# Patient Record
Sex: FEMALE | Race: WHITE | NOT HISPANIC OR LATINO
[De-identification: names, ages, dates, MRNs, and addresses within clinical notes are randomized per-mention and may not be internally consistent; named-entity substitution may affect disease eponyms.]

---

## 2017-03-15 ENCOUNTER — TRANSCRIPTION ENCOUNTER (OUTPATIENT)
Age: 52
End: 2017-03-15

## 2017-07-27 ENCOUNTER — APPOINTMENT (OUTPATIENT)
Dept: ULTRASOUND IMAGING | Facility: CLINIC | Age: 52
End: 2017-07-27
Payer: COMMERCIAL

## 2017-07-27 ENCOUNTER — OUTPATIENT (OUTPATIENT)
Dept: OUTPATIENT SERVICES | Facility: HOSPITAL | Age: 52
LOS: 1 days | End: 2017-07-27
Payer: COMMERCIAL

## 2017-07-27 ENCOUNTER — APPOINTMENT (OUTPATIENT)
Dept: MAMMOGRAPHY | Facility: CLINIC | Age: 52
End: 2017-07-27
Payer: COMMERCIAL

## 2017-07-27 DIAGNOSIS — Z00.8 ENCOUNTER FOR OTHER GENERAL EXAMINATION: ICD-10-CM

## 2017-07-27 PROCEDURE — G0204: CPT | Mod: 26

## 2017-07-27 PROCEDURE — 76641 ULTRASOUND BREAST COMPLETE: CPT | Mod: 26,50

## 2017-07-27 PROCEDURE — 76641 ULTRASOUND BREAST COMPLETE: CPT

## 2017-07-27 PROCEDURE — G0279: CPT | Mod: 26

## 2017-07-27 PROCEDURE — 77066 DX MAMMO INCL CAD BI: CPT

## 2017-07-27 PROCEDURE — G0279: CPT

## 2019-06-21 ENCOUNTER — TRANSCRIPTION ENCOUNTER (OUTPATIENT)
Age: 54
End: 2019-06-21

## 2019-09-10 ENCOUNTER — APPOINTMENT (OUTPATIENT)
Dept: MAMMOGRAPHY | Facility: CLINIC | Age: 54
End: 2019-09-10
Payer: COMMERCIAL

## 2019-09-10 ENCOUNTER — OUTPATIENT (OUTPATIENT)
Dept: OUTPATIENT SERVICES | Facility: HOSPITAL | Age: 54
LOS: 1 days | End: 2019-09-10
Payer: COMMERCIAL

## 2019-09-10 ENCOUNTER — APPOINTMENT (OUTPATIENT)
Dept: ULTRASOUND IMAGING | Facility: CLINIC | Age: 54
End: 2019-09-10
Payer: COMMERCIAL

## 2019-09-10 DIAGNOSIS — Z00.8 ENCOUNTER FOR OTHER GENERAL EXAMINATION: ICD-10-CM

## 2019-09-10 PROCEDURE — 76641 ULTRASOUND BREAST COMPLETE: CPT | Mod: 26,50

## 2019-09-10 PROCEDURE — 77067 SCR MAMMO BI INCL CAD: CPT | Mod: 26

## 2019-09-10 PROCEDURE — 77067 SCR MAMMO BI INCL CAD: CPT

## 2019-09-10 PROCEDURE — 77063 BREAST TOMOSYNTHESIS BI: CPT

## 2019-09-10 PROCEDURE — 77063 BREAST TOMOSYNTHESIS BI: CPT | Mod: 26

## 2019-09-10 PROCEDURE — 76641 ULTRASOUND BREAST COMPLETE: CPT

## 2021-02-03 ENCOUNTER — RESULT REVIEW (OUTPATIENT)
Age: 56
End: 2021-02-03

## 2021-02-11 ENCOUNTER — APPOINTMENT (OUTPATIENT)
Dept: ULTRASOUND IMAGING | Facility: CLINIC | Age: 56
End: 2021-02-11
Payer: COMMERCIAL

## 2021-02-11 ENCOUNTER — OUTPATIENT (OUTPATIENT)
Dept: OUTPATIENT SERVICES | Facility: HOSPITAL | Age: 56
LOS: 1 days | End: 2021-02-11
Payer: COMMERCIAL

## 2021-02-11 ENCOUNTER — APPOINTMENT (OUTPATIENT)
Dept: MAMMOGRAPHY | Facility: CLINIC | Age: 56
End: 2021-02-11
Payer: COMMERCIAL

## 2021-02-11 DIAGNOSIS — N28.1 CYST OF KIDNEY, ACQUIRED: ICD-10-CM

## 2021-02-11 PROCEDURE — 77063 BREAST TOMOSYNTHESIS BI: CPT

## 2021-02-11 PROCEDURE — 77063 BREAST TOMOSYNTHESIS BI: CPT | Mod: 26

## 2021-02-11 PROCEDURE — 77067 SCR MAMMO BI INCL CAD: CPT | Mod: 26

## 2021-02-11 PROCEDURE — 76641 ULTRASOUND BREAST COMPLETE: CPT | Mod: 26,50

## 2021-02-11 PROCEDURE — 77067 SCR MAMMO BI INCL CAD: CPT

## 2021-02-11 PROCEDURE — 76641 ULTRASOUND BREAST COMPLETE: CPT

## 2021-05-06 ENCOUNTER — TRANSCRIPTION ENCOUNTER (OUTPATIENT)
Age: 56
End: 2021-05-06

## 2021-07-30 ENCOUNTER — TRANSCRIPTION ENCOUNTER (OUTPATIENT)
Age: 56
End: 2021-07-30

## 2022-02-03 ENCOUNTER — TRANSCRIPTION ENCOUNTER (OUTPATIENT)
Age: 57
End: 2022-02-03

## 2022-03-24 ENCOUNTER — ASOB RESULT (OUTPATIENT)
Age: 57
End: 2022-03-24

## 2022-03-24 ENCOUNTER — APPOINTMENT (OUTPATIENT)
Dept: OBGYN | Facility: CLINIC | Age: 57
End: 2022-03-24
Payer: COMMERCIAL

## 2022-03-24 PROCEDURE — 76830 TRANSVAGINAL US NON-OB: CPT

## 2022-03-28 ENCOUNTER — APPOINTMENT (OUTPATIENT)
Dept: MAMMOGRAPHY | Facility: CLINIC | Age: 57
End: 2022-03-28
Payer: COMMERCIAL

## 2022-03-28 ENCOUNTER — APPOINTMENT (OUTPATIENT)
Dept: ULTRASOUND IMAGING | Facility: CLINIC | Age: 57
End: 2022-03-28
Payer: COMMERCIAL

## 2022-03-28 ENCOUNTER — OUTPATIENT (OUTPATIENT)
Dept: OUTPATIENT SERVICES | Facility: HOSPITAL | Age: 57
LOS: 1 days | End: 2022-03-28
Payer: COMMERCIAL

## 2022-03-28 DIAGNOSIS — Z00.8 ENCOUNTER FOR OTHER GENERAL EXAMINATION: ICD-10-CM

## 2022-03-28 PROCEDURE — 77067 SCR MAMMO BI INCL CAD: CPT | Mod: 26

## 2022-03-28 PROCEDURE — 77067 SCR MAMMO BI INCL CAD: CPT

## 2022-03-28 PROCEDURE — 76641 ULTRASOUND BREAST COMPLETE: CPT | Mod: 26,50

## 2022-03-28 PROCEDURE — 77063 BREAST TOMOSYNTHESIS BI: CPT | Mod: 26

## 2022-03-28 PROCEDURE — 76641 ULTRASOUND BREAST COMPLETE: CPT

## 2022-03-28 PROCEDURE — 77063 BREAST TOMOSYNTHESIS BI: CPT

## 2022-07-19 ENCOUNTER — NON-APPOINTMENT (OUTPATIENT)
Age: 57
End: 2022-07-19

## 2022-07-27 ENCOUNTER — APPOINTMENT (OUTPATIENT)
Dept: ORTHOPEDIC SURGERY | Facility: CLINIC | Age: 57
End: 2022-07-27

## 2022-07-27 ENCOUNTER — TRANSCRIPTION ENCOUNTER (OUTPATIENT)
Age: 57
End: 2022-07-27

## 2022-07-27 VITALS — BODY MASS INDEX: 18.78 KG/M2 | WEIGHT: 110 LBS | HEIGHT: 64 IN

## 2022-07-27 DIAGNOSIS — M75.01 ADHESIVE CAPSULITIS OF RIGHT SHOULDER: ICD-10-CM

## 2022-07-27 PROCEDURE — 73030 X-RAY EXAM OF SHOULDER: CPT | Mod: RT

## 2022-07-27 PROCEDURE — 99203 OFFICE O/P NEW LOW 30 MIN: CPT

## 2022-07-27 PROCEDURE — 99072 ADDL SUPL MATRL&STAF TM PHE: CPT

## 2022-07-28 PROBLEM — M75.01 ADHESIVE CAPSULITIS OF RIGHT SHOULDER: Status: ACTIVE | Noted: 2022-07-28

## 2022-07-28 NOTE — HISTORY OF PRESENT ILLNESS
[de-identified] : 56 year old female HX adhesive capsulitis to the right shoulder  presents today with right shoulder pain since January 2022. No injury reported. The pain is brought on with FF and abduction. C/O limited ROM/numbness in her finger tips. Takes Advil occasionally with relief. States that pain can radiate in to the neck. She has trialed acupuncture with no significant relief. The patient is active and likes to participate in yoga and cycling. \par \par The patient's past medical history, past surgical history, medications and allergies were reviewed by me today with the patient and documented accordingly. In addition, the patient's family and social history, which were noncontributory to this visit, were reviewed also.

## 2022-07-28 NOTE — DISCUSSION/SUMMARY
[de-identified] : 55 y/o female with right shoulder adhesive capsulitis\par \par The patient presents today with a painful gradual loss of active and passive glenohumeral motion. This is likely due to progressive fibrosis and contracture of the glenohumeral joint. This is referred to as adhesive capsulitis or "frozen shoulder".\par \par I discussed that this occurs in approximately 2-5% of the population, and can affects the contralateral shoulder in approximately 20-30% of patients. This can be due to a primary idiopathic condition, or because of a secondary underlying structural condition. I discussed the 4 stages of adhesive capsulitis. Stage I refers to an inflammatory condition that causes night pain/discomfort with associated full passive ROM. Stage II results in severe pain and restriction of motion during a hyper-inflammatory synovitis. Stage III results in profound tethered stiffness throughout range of motion without acute inflammatory changes. Stage IV results in stiffness with minimal residual pain and dysfunction.I discussed that the natural history of the condition is self-limiting however, this may take up to 12-24 months. Nonoperative treatment includes pharmacological treatment of the inflammation (including NSAID's, intra-articular corticosteroids) and aggressive physical therapy (if tolerated), surgery can address capsular contraction with release/manipulation under anesthesia.\par \par Recommendation: Begin trial of PT, Rx given. Conservative care & observation, this includes rest/activity avoidance until less symptomatic with subsequent gradual return to full activity as tolerated. Patient may also use OTC NSAID's or acetaminophen as tolerated, with application of ice to the area 2-3x daily for 20 minutes after periods of activity. \par \par Follow up 3 months.

## 2022-07-28 NOTE — ADDENDUM
[FreeTextEntry1] : This note was written by Birgit Majano on 07/27/2022 acting solely as a scribe for Dr. Armin Greenberg.\par \par All medical record entries made by the Scribe were at my, Dr. Armin Greenberg, direction and personally dictated by me on 07/27/2022. I have personally reviewed the chart and agree that the record accurately reflects my personal performance of the history, physical exam, assessment and plan.

## 2022-07-28 NOTE — PHYSICAL EXAM
[de-identified] : Oriented to time, place, person\par Mood: Normal\par Affect: Normal\par Appearance: Healthy, well appearing, no acute distress.\par Gait: Normal\par Assistive Devices: None\par \par Right shoulder exam:\par \par Inspection: No malalignment, No defects, No atrophy\par Skin: No masses, No lesions\par Neck: Negative Spurling, full ROM, no pain with ROM\par AROM: FF to 160, abduction to 90, ER to 70, IR to lower lumbar\par Painful arc ROM: none\par Tenderness: No bicipital tenderness, no tenderness to greater tuberosity/RTC insertion, no anterior shoulder/lesser tuberosity tenderness\par Strength: 5/5 ER, 5/5 IR in adduction, 5/5 supraspinatus testing, negative Carolina's test\par AC joint: No TTP/pain with cross arm testing\par Biceps: Speed Negative, Yergason Negative \par Impingement test: + Coreas, + Neer\par Vasc: 2+ radial pulse \par Stability: Stable \par Neuro: AIN, PIN, Ulnar nerve intact to motor\par Sensation: Intact to light touch throughout  [de-identified] : The following radiographs were ordered and read by me during this patients visit. I reviewed each radiograph in detail with the patient and discussed the findings as highlighted below.\par \par 3 views of right shoulder were obtained today, 07/27/2022, that show no acute fracture or dislocation. There is no glenohumeral and no AC joint degenerative change seen. Type II acromion. There is no significant malalignment. No significant other obvious osseous abnormality, otherwise unremarkable.

## 2022-08-16 ENCOUNTER — NON-APPOINTMENT (OUTPATIENT)
Age: 57
End: 2022-08-16

## 2022-10-07 ENCOUNTER — NON-APPOINTMENT (OUTPATIENT)
Age: 57
End: 2022-10-07

## 2022-11-06 ENCOUNTER — NON-APPOINTMENT (OUTPATIENT)
Age: 57
End: 2022-11-06

## 2022-11-23 ENCOUNTER — NON-APPOINTMENT (OUTPATIENT)
Age: 57
End: 2022-11-23

## 2022-11-23 ENCOUNTER — APPOINTMENT (OUTPATIENT)
Dept: INTERNAL MEDICINE | Facility: CLINIC | Age: 57
End: 2022-11-23
Payer: COMMERCIAL

## 2022-11-23 VITALS
SYSTOLIC BLOOD PRESSURE: 120 MMHG | HEART RATE: 73 BPM | OXYGEN SATURATION: 99 % | BODY MASS INDEX: 18.95 KG/M2 | HEIGHT: 64 IN | TEMPERATURE: 98.6 F | WEIGHT: 111 LBS | DIASTOLIC BLOOD PRESSURE: 68 MMHG

## 2022-11-23 DIAGNOSIS — R31.29 OTHER MICROSCOPIC HEMATURIA: ICD-10-CM

## 2022-11-23 DIAGNOSIS — Z81.8 FAMILY HISTORY OF OTHER MENTAL AND BEHAVIORAL DISORDERS: ICD-10-CM

## 2022-11-23 DIAGNOSIS — Z82.49 FAMILY HISTORY OF ISCHEMIC HEART DISEASE AND OTHER DISEASES OF THE CIRCULATORY SYSTEM: ICD-10-CM

## 2022-11-23 DIAGNOSIS — T56.1X1A TOXIC EFFECT OF MERCURY AND ITS COMPOUNDS, ACCIDENTAL (UNINTENTIONAL), INITIAL ENCOUNTER: ICD-10-CM

## 2022-11-23 DIAGNOSIS — F10.10 ALCOHOL ABUSE, UNCOMPLICATED: ICD-10-CM

## 2022-11-23 DIAGNOSIS — Z00.00 ENCOUNTER FOR GENERAL ADULT MEDICAL EXAMINATION W/OUT ABNORMAL FINDINGS: ICD-10-CM

## 2022-11-23 DIAGNOSIS — U07.1 COVID-19: ICD-10-CM

## 2022-11-23 DIAGNOSIS — K63.5 POLYP OF COLON: ICD-10-CM

## 2022-11-23 PROCEDURE — G0444 DEPRESSION SCREEN ANNUAL: CPT | Mod: 59

## 2022-11-23 PROCEDURE — 82271 OCCULT BLOOD OTHER SOURCES: CPT | Mod: QW

## 2022-11-23 PROCEDURE — 99386 PREV VISIT NEW AGE 40-64: CPT | Mod: 25

## 2022-11-23 PROCEDURE — 93000 ELECTROCARDIOGRAM COMPLETE: CPT | Mod: 59

## 2022-11-23 PROCEDURE — 99072 ADDL SUPL MATRL&STAF TM PHE: CPT

## 2022-11-23 NOTE — HEALTH RISK ASSESSMENT
[Never] : Never [No] : In the past 12 months have you used drugs other than those required for medical reasons? No [0] : 2) Feeling down, depressed, or hopeless: Not at all (0) [No falls in past year] : Patient reported no falls in the past year [PHQ-2 Negative - No further assessment needed] : PHQ-2 Negative - No further assessment needed [None] : None [With Family] : lives with family [] :  [Fully functional (bathing, dressing, toileting, transferring, walking, feeding)] : Fully functional (bathing, dressing, toileting, transferring, walking, feeding) [Fully functional (using the telephone, shopping, preparing meals, housekeeping, doing laundry, using] : Fully functional and needs no help or supervision to perform IADLs (using the telephone, shopping, preparing meals, housekeeping, doing laundry, using transportation, managing medications and managing finances) [Smoke Detector] : smoke detector [Carbon Monoxide Detector] : carbon monoxide detector [Seat Belt] :  uses seat belt [Sunscreen] : uses sunscreen [With Patient/Caregiver] : , with patient/caregiver [Patient reported mammogram was normal] : Patient reported mammogram was normal [Patient reported PAP Smear was normal] : Patient reported PAP Smear was normal [de-identified] : sebastian / [de-identified] : no red meat [[id number]] : 0 [EyeExamDate] : 1/1/22 [Change in mental status noted] : No change in mental status noted [MammogramDate] : 3/1/22 [MammogramComments] : dense  breast tissue [PapSmearDate] : 3/1/22 [ColonoscopyDate] : 1/1/21 [ColonoscopyComments] : benign polyp [AdvancecareDate] : 11/23/22

## 2022-11-23 NOTE — ASSESSMENT
[FreeTextEntry1] : advised egg free flu and covid booster.\par All preventative measures were reviewed with the patient and the patient is due for and agrees to the following as outlined  in the plan  below.\par
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
## 2022-11-29 DIAGNOSIS — S10.96XD INSECT BITE OF UNSPECIFIED PART OF NECK, SUBSEQUENT ENCOUNTER: ICD-10-CM

## 2022-12-28 ENCOUNTER — LABORATORY RESULT (OUTPATIENT)
Age: 57
End: 2022-12-28

## 2023-01-04 ENCOUNTER — NON-APPOINTMENT (OUTPATIENT)
Age: 58
End: 2023-01-04

## 2023-01-04 LAB
25(OH)D3 SERPL-MCNC: 77.2 NG/ML
ALBUMIN SERPL ELPH-MCNC: 4.6 G/DL
ALP BLD-CCNC: 101 U/L
ALT SERPL-CCNC: 18 U/L
ANION GAP SERPL CALC-SCNC: 10 MMOL/L
APPEARANCE: CLEAR
AST SERPL-CCNC: 21 U/L
BACTERIA: NEGATIVE
BASOPHILS # BLD AUTO: 0.03 K/UL
BASOPHILS NFR BLD AUTO: 0.6 %
BILIRUB DIRECT SERPL-MCNC: 0.1 MG/DL
BILIRUB SERPL-MCNC: 0.3 MG/DL
BILIRUBIN URINE: NEGATIVE
BLOOD URINE: ABNORMAL
BUN SERPL-MCNC: 16 MG/DL
CALCIUM SERPL-MCNC: 9.6 MG/DL
CHLORIDE SERPL-SCNC: 101 MMOL/L
CHOLEST SERPL-MCNC: 155 MG/DL
CO2 SERPL-SCNC: 28 MMOL/L
COLOR: YELLOW
CREAT SERPL-MCNC: 0.74 MG/DL
EGFR: 94 ML/MIN/1.73M2
EOSINOPHIL # BLD AUTO: 0.14 K/UL
EOSINOPHIL NFR BLD AUTO: 2.8 %
FERRITIN SERPL-MCNC: 77 NG/ML
GLUCOSE QUALITATIVE U: NEGATIVE
GLUCOSE SERPL-MCNC: 99 MG/DL
HCT VFR BLD CALC: 41.4 %
HCV AB SER QL: NONREACTIVE
HCV S/CO RATIO: 0.26 S/CO
HDLC SERPL-MCNC: 52 MG/DL
HGB BLD-MCNC: 13.8 G/DL
HYALINE CASTS: 3 /LPF
IMM GRANULOCYTES NFR BLD AUTO: 0.4 %
IRON SERPL-MCNC: 48 UG/DL
KETONES URINE: NEGATIVE
LDLC SERPL CALC-MCNC: 91 MG/DL
LEUKOCYTE ESTERASE URINE: ABNORMAL
LYMPHOCYTES # BLD AUTO: 1.43 K/UL
LYMPHOCYTES NFR BLD AUTO: 28.3 %
MAN DIFF?: NORMAL
MCHC RBC-ENTMCNC: 31.9 PG
MCHC RBC-ENTMCNC: 33.3 GM/DL
MCV RBC AUTO: 95.6 FL
MICROSCOPIC-UA: NORMAL
MONOCYTES # BLD AUTO: 0.28 K/UL
MONOCYTES NFR BLD AUTO: 5.5 %
NEUTROPHILS # BLD AUTO: 3.15 K/UL
NEUTROPHILS NFR BLD AUTO: 62.4 %
NITRITE URINE: NEGATIVE
NONHDLC SERPL-MCNC: 102 MG/DL
PH URINE: 6
PLATELET # BLD AUTO: 216 K/UL
POTASSIUM SERPL-SCNC: 4.3 MMOL/L
PROT SERPL-MCNC: 7 G/DL
PROTEIN URINE: NORMAL
RBC # BLD: 4.33 M/UL
RBC # FLD: 12 %
RED BLOOD CELLS URINE: 7 /HPF
SODIUM SERPL-SCNC: 139 MMOL/L
SPECIFIC GRAVITY URINE: 1.02
SQUAMOUS EPITHELIAL CELLS: 6 /HPF
T4 SERPL-MCNC: 7.9 UG/DL
TRIGL SERPL-MCNC: 58 MG/DL
TSH SERPL-ACNC: 2.48 UIU/ML
UROBILINOGEN URINE: NORMAL
WBC # FLD AUTO: 5.05 K/UL
WHITE BLOOD CELLS URINE: 8 /HPF

## 2023-01-05 LAB
BACTERIA UR CULT: NORMAL
ESTIMATED AVERAGE GLUCOSE: 111 MG/DL
HBA1C MFR BLD HPLC: 5.5 %

## 2023-01-09 ENCOUNTER — NON-APPOINTMENT (OUTPATIENT)
Age: 58
End: 2023-01-09

## 2023-01-10 ENCOUNTER — APPOINTMENT (OUTPATIENT)
Dept: RADIOLOGY | Facility: CLINIC | Age: 58
End: 2023-01-10
Payer: COMMERCIAL

## 2023-01-10 ENCOUNTER — APPOINTMENT (OUTPATIENT)
Dept: ULTRASOUND IMAGING | Facility: CLINIC | Age: 58
End: 2023-01-10
Payer: COMMERCIAL

## 2023-01-10 ENCOUNTER — OUTPATIENT (OUTPATIENT)
Dept: OUTPATIENT SERVICES | Facility: HOSPITAL | Age: 58
LOS: 1 days | End: 2023-01-10
Payer: COMMERCIAL

## 2023-01-10 DIAGNOSIS — Z00.00 ENCOUNTER FOR GENERAL ADULT MEDICAL EXAMINATION WITHOUT ABNORMAL FINDINGS: ICD-10-CM

## 2023-01-10 PROCEDURE — 93880 EXTRACRANIAL BILAT STUDY: CPT | Mod: 26

## 2023-01-10 PROCEDURE — 77085 DXA BONE DENSITY AXL VRT FX: CPT | Mod: 26

## 2023-01-10 PROCEDURE — 93880 EXTRACRANIAL BILAT STUDY: CPT

## 2023-01-10 PROCEDURE — 77085 DXA BONE DENSITY AXL VRT FX: CPT

## 2023-01-11 ENCOUNTER — NON-APPOINTMENT (OUTPATIENT)
Age: 58
End: 2023-01-11

## 2023-01-14 ENCOUNTER — OUTPATIENT (OUTPATIENT)
Dept: OUTPATIENT SERVICES | Facility: HOSPITAL | Age: 58
LOS: 1 days | End: 2023-01-14
Payer: COMMERCIAL

## 2023-01-14 ENCOUNTER — APPOINTMENT (OUTPATIENT)
Dept: CT IMAGING | Facility: CLINIC | Age: 58
End: 2023-01-14
Payer: COMMERCIAL

## 2023-01-14 DIAGNOSIS — R31.29 OTHER MICROSCOPIC HEMATURIA: ICD-10-CM

## 2023-01-14 PROCEDURE — 74176 CT ABD & PELVIS W/O CONTRAST: CPT | Mod: 26

## 2023-01-14 PROCEDURE — 74176 CT ABD & PELVIS W/O CONTRAST: CPT

## 2023-01-19 DIAGNOSIS — N13.4 HYDROURETER: ICD-10-CM

## 2023-01-23 ENCOUNTER — NON-APPOINTMENT (OUTPATIENT)
Age: 58
End: 2023-01-23

## 2023-01-31 ENCOUNTER — APPOINTMENT (OUTPATIENT)
Dept: UROLOGY | Facility: CLINIC | Age: 58
End: 2023-01-31

## 2023-02-02 ENCOUNTER — APPOINTMENT (OUTPATIENT)
Dept: UROLOGY | Facility: CLINIC | Age: 58
End: 2023-02-02

## 2023-05-23 ENCOUNTER — APPOINTMENT (OUTPATIENT)
Dept: DERMATOLOGY | Facility: CLINIC | Age: 58
End: 2023-05-23

## 2023-06-19 ENCOUNTER — APPOINTMENT (OUTPATIENT)
Dept: DERMATOLOGY | Facility: CLINIC | Age: 58
End: 2023-06-19

## 2023-08-17 ENCOUNTER — APPOINTMENT (OUTPATIENT)
Dept: ENDOCRINOLOGY | Facility: CLINIC | Age: 58
End: 2023-08-17
Payer: COMMERCIAL

## 2023-08-17 VITALS
OXYGEN SATURATION: 99 % | TEMPERATURE: 97.9 F | WEIGHT: 116.5 LBS | HEIGHT: 64 IN | HEART RATE: 65 BPM | RESPIRATION RATE: 14 BRPM | BODY MASS INDEX: 19.89 KG/M2 | DIASTOLIC BLOOD PRESSURE: 60 MMHG | SYSTOLIC BLOOD PRESSURE: 110 MMHG

## 2023-08-17 DIAGNOSIS — M81.0 AGE-RELATED OSTEOPOROSIS W/OUT CURRENT PATHOLOGICAL FRACTURE: ICD-10-CM

## 2023-08-17 PROCEDURE — 99204 OFFICE O/P NEW MOD 45 MIN: CPT

## 2023-08-17 NOTE — ASSESSMENT
[FreeTextEntry1] : 57 year old female with PMH of eating disorder, BMI 20, h/o Mirena use (removed in mid 50s), anxious is presenting for Osteoporosis.   1. Osteoporosis.  She has no history of fragility fracture.  We discussed completion of a metabolic evaluation for secondary causes of bone loss.   We discussed the potential benefits and risks of the osteoanabolic and antiresorptive classes of pharmacologic osteoporosis therapy. We also discussed the potential benefits and risks of antiresorptive osteoporosis therapy with oral bisphosphonates at length, including but not limited to ONJ and atypical femoral fracture.   I still recommend therapy with oral bisphosphonates. Pt hesitant to start medications.  She will consider her options pending evaluation below.  Metabolic evaluation for secondary causes of osteoporosis Calcium 1000 mg daily from diet and supplements (to be taken in divided doses as no more than 500-600 mg can be absorbed at one time); advised increased dietary and/or supplemental calcium Can decrease vitamin D supplementation to 3000 intl units daily Diet, weight bearing exercises and fall prevention discussed  Patient verbalized understanding of the above. All questions were answered to patient's satisfaction. Dispo: Patient will follow up in 4 weeks to discuss above results.

## 2023-08-17 NOTE — HISTORY OF PRESENT ILLNESS
[FreeTextEntry1] : Ms. Dc is presenting for Osteoporosis.   57 year old female with PMH of eating disorder, BMI 20, h/o Mirena use (removed in mid 50s), anxious.   Home meds: Magnesium, Probiotic.  Fracture history: No  Family history: No parental history of hip fracture  Prior Treatment: None  Prior HRT: No   Bone History Menopause: Mid 50s  Jan 2023: T-scores of -2.4 at the lumbar spine, -2.6 at the femoral neck. No compression fracture.   Falls: No Height loss: No Kidney stones: No Dental health: Regular appointments, has history of implants (<1 year ago), no upcoming procedures planned. Exercise: Yes, daily 10 stretch or walk. 20 min yoga or pilate or barr,  Dairy intake: Yogurt daily. Spinach/broccoli.  Calcium supplements: Yes, does not remember.  Multivitamin: No Vitamin D supplements: D3   Osteoporosis risk factors include: Postmenopausal status,  race, prior fracture, falls, height loss, small thin bones, tobacco use, malignancy, radiation treatment, excessive alcohol, anorexia, family history, vitamin D deficiency, long term corticosteroid use (>3 months), seizure medications (ie phenytoin), prolonged amenorrhea, malabsorption, hyperparathyroidism, hyperthyroidism. NEGATIVE EXCEPT:  Postmenopausal status,  race, eating disorders, excessive alcohol.

## 2023-08-17 NOTE — PHYSICAL EXAM
[Alert] : alert [Well Nourished] : well nourished [No Acute Distress] : no acute distress [EOMI] : extra ocular movement intact [Normal Hearing] : hearing was normal [No Respiratory Distress] : no respiratory distress [Normal Rate] : heart rate was normal [Kyphosis] : no kyphosis present [Normal Gait] : normal gait [Oriented x3] : oriented to person, place, and time [Normal Affect] : the affect was normal [Normal Mood] : the mood was normal

## 2023-09-21 ENCOUNTER — APPOINTMENT (OUTPATIENT)
Dept: MAMMOGRAPHY | Facility: CLINIC | Age: 58
End: 2023-09-21
Payer: COMMERCIAL

## 2023-09-21 ENCOUNTER — APPOINTMENT (OUTPATIENT)
Dept: ULTRASOUND IMAGING | Facility: CLINIC | Age: 58
End: 2023-09-21
Payer: COMMERCIAL

## 2023-09-21 ENCOUNTER — OUTPATIENT (OUTPATIENT)
Dept: OUTPATIENT SERVICES | Facility: HOSPITAL | Age: 58
LOS: 1 days | End: 2023-09-21
Payer: COMMERCIAL

## 2023-09-21 DIAGNOSIS — Z00.8 ENCOUNTER FOR OTHER GENERAL EXAMINATION: ICD-10-CM

## 2023-09-21 PROCEDURE — 77063 BREAST TOMOSYNTHESIS BI: CPT | Mod: 26

## 2023-09-21 PROCEDURE — 77067 SCR MAMMO BI INCL CAD: CPT

## 2023-09-21 PROCEDURE — 77067 SCR MAMMO BI INCL CAD: CPT | Mod: 26

## 2023-09-21 PROCEDURE — 76641 ULTRASOUND BREAST COMPLETE: CPT | Mod: 26,50

## 2023-09-21 PROCEDURE — 77063 BREAST TOMOSYNTHESIS BI: CPT

## 2023-09-21 PROCEDURE — 76641 ULTRASOUND BREAST COMPLETE: CPT

## 2023-10-05 LAB
25(OH)D3 SERPL-MCNC: 80.6 NG/ML
ALBUMIN MFR SERPL ELPH: 61 %
ALBUMIN SERPL-MCNC: 4.8 G/DL
ALBUMIN/GLOB SERPL: 1.6 RATIO
ALP BONE SERPL-MCNC: 23.6 UG/L
ALPHA1 GLOB MFR SERPL ELPH: 3.5 %
ALPHA1 GLOB SERPL ELPH-MCNC: 0.3 G/DL
ALPHA2 GLOB MFR SERPL ELPH: 9.3 %
ALPHA2 GLOB SERPL ELPH-MCNC: 0.7 G/DL
B-GLOBULIN MFR SERPL ELPH: 12 %
B-GLOBULIN SERPL ELPH-MCNC: 0.9 G/DL
CALCIUM SERPL-MCNC: 10 MG/DL
COLLAGEN CTX SERPL-MCNC: 365 PG/ML
COLLAGEN NTX UR-SCNC: 1151 NMOL BCE
COLLAGEN NTX/CREAT UR-SRTO: 78
CREAT UR-MCNC: 167.1 MG/DL
GAMMA GLOB FLD ELPH-MCNC: 1.1 G/DL
GAMMA GLOB MFR SERPL ELPH: 14.2 %
INTERPRETATION SERPL IEP-IMP: NORMAL
INTERPRETIVE GUIDE:: NORMAL
MAGNESIUM SERPL-MCNC: 2.2 MG/DL
PARATHYROID HORMONE INTACT: 52 PG/ML
PROT SERPL-MCNC: 7.8 G/DL
PROT SERPL-MCNC: 7.8 G/DL
TTG IGA SER IA-ACNC: <1.2 U/ML
TTG IGA SER-ACNC: NEGATIVE
TTG IGG SER IA-ACNC: 2.1 U/ML
TTG IGG SER IA-ACNC: NEGATIVE

## 2024-01-12 ENCOUNTER — APPOINTMENT (OUTPATIENT)
Dept: OPHTHALMOLOGY | Facility: CLINIC | Age: 59
End: 2024-01-12

## 2024-01-24 ENCOUNTER — APPOINTMENT (OUTPATIENT)
Dept: OPHTHALMOLOGY | Facility: CLINIC | Age: 59
End: 2024-01-24

## 2024-07-03 ENCOUNTER — TRANSCRIPTION ENCOUNTER (OUTPATIENT)
Age: 59
End: 2024-07-03

## 2024-08-15 ENCOUNTER — APPOINTMENT (OUTPATIENT)
Dept: INTERNAL MEDICINE | Facility: CLINIC | Age: 59
End: 2024-08-15
Payer: COMMERCIAL

## 2024-08-15 ENCOUNTER — NON-APPOINTMENT (OUTPATIENT)
Age: 59
End: 2024-08-15

## 2024-08-15 VITALS
HEIGHT: 64 IN | WEIGHT: 107 LBS | OXYGEN SATURATION: 99 % | HEART RATE: 69 BPM | SYSTOLIC BLOOD PRESSURE: 123 MMHG | TEMPERATURE: 98.6 F | DIASTOLIC BLOOD PRESSURE: 69 MMHG | BODY MASS INDEX: 18.27 KG/M2

## 2024-08-15 DIAGNOSIS — Z01.818 ENCOUNTER FOR OTHER PREPROCEDURAL EXAMINATION: ICD-10-CM

## 2024-08-15 DIAGNOSIS — R31.29 OTHER MICROSCOPIC HEMATURIA: ICD-10-CM

## 2024-08-15 PROCEDURE — 93000 ELECTROCARDIOGRAM COMPLETE: CPT

## 2024-08-15 PROCEDURE — 99214 OFFICE O/P EST MOD 30 MIN: CPT

## 2024-08-15 PROCEDURE — 36415 COLL VENOUS BLD VENIPUNCTURE: CPT

## 2024-08-16 NOTE — ADDENDUM
[FreeTextEntry1] : Preop labs resulted and reviewed- there is NO CONTRAINDICATION to planned surgery

## 2024-08-16 NOTE — HISTORY OF PRESENT ILLNESS
[No Pertinent Pulmonary History] : no history of asthma, COPD, sleep apnea, or smoking [No Adverse Anesthesia Reaction] : no adverse anesthesia reaction in self or family member [(Patient denies any chest pain, claudication, dyspnea on exertion, orthopnea, palpitations or syncope)] : Patient denies any chest pain, claudication, dyspnea on exertion, orthopnea, palpitations or syncope [____ METs%] : [unfilled] METs% [No Pertinent Cardiac History] : no history of aortic stenosis, atrial fibrillation, coronary artery disease, recent myocardial infarction, or implantable device/pacemaker [Chronic Anticoagulation] : no chronic anticoagulation [Chronic Kidney Disease] : no chronic kidney disease [Diabetes] : no diabetes [FreeTextEntry1] : cataract [FreeTextEntry2] : 8/22/24 [FreeTextEntry3] : Dr. Lopez [FreeTextEntry4] : Pt here for medical clearance for cataract surgery. She also is planning  retina surgery 9/12/24 [FreeTextEntry6] : exercises reg  cardio/  elliptical [FreeTextEntry7] : had work up  2021.

## 2024-08-18 LAB
ALBUMIN SERPL ELPH-MCNC: 4.7 G/DL
ALP BLD-CCNC: 89 U/L
ALT SERPL-CCNC: 26 U/L
ANION GAP SERPL CALC-SCNC: 11 MMOL/L
AST SERPL-CCNC: 20 U/L
BASOPHILS # BLD AUTO: 0.03 K/UL
BASOPHILS NFR BLD AUTO: 0.5 %
BILIRUB SERPL-MCNC: 0.4 MG/DL
BUN SERPL-MCNC: 13 MG/DL
CALCIUM SERPL-MCNC: 9.9 MG/DL
CHLORIDE SERPL-SCNC: 101 MMOL/L
CO2 SERPL-SCNC: 28 MMOL/L
CREAT SERPL-MCNC: 0.69 MG/DL
EGFR: 101 ML/MIN/1.73M2
EOSINOPHIL # BLD AUTO: 0.04 K/UL
EOSINOPHIL NFR BLD AUTO: 0.6 %
GLUCOSE SERPL-MCNC: 111 MG/DL
HCT VFR BLD CALC: 42.5 %
HGB BLD-MCNC: 13.6 G/DL
IMM GRANULOCYTES NFR BLD AUTO: 0.2 %
LYMPHOCYTES # BLD AUTO: 1.74 K/UL
LYMPHOCYTES NFR BLD AUTO: 26.9 %
MAN DIFF?: NORMAL
MCHC RBC-ENTMCNC: 31.2 PG
MCHC RBC-ENTMCNC: 32 GM/DL
MCV RBC AUTO: 97.5 FL
MONOCYTES # BLD AUTO: 0.4 K/UL
MONOCYTES NFR BLD AUTO: 6.2 %
NEUTROPHILS # BLD AUTO: 4.25 K/UL
NEUTROPHILS NFR BLD AUTO: 65.6 %
PLATELET # BLD AUTO: 208 K/UL
POTASSIUM SERPL-SCNC: 4.3 MMOL/L
PROT SERPL-MCNC: 7.2 G/DL
RBC # BLD: 4.36 M/UL
RBC # FLD: 12.2 %
SODIUM SERPL-SCNC: 140 MMOL/L
WBC # FLD AUTO: 6.47 K/UL

## 2024-08-19 ENCOUNTER — NON-APPOINTMENT (OUTPATIENT)
Age: 59
End: 2024-08-19

## 2024-10-10 ENCOUNTER — APPOINTMENT (OUTPATIENT)
Dept: ULTRASOUND IMAGING | Facility: CLINIC | Age: 59
End: 2024-10-10

## 2024-10-10 ENCOUNTER — APPOINTMENT (OUTPATIENT)
Dept: MAMMOGRAPHY | Facility: CLINIC | Age: 59
End: 2024-10-10

## 2024-10-10 ENCOUNTER — OUTPATIENT (OUTPATIENT)
Dept: OUTPATIENT SERVICES | Facility: HOSPITAL | Age: 59
LOS: 1 days | End: 2024-10-10
Payer: COMMERCIAL

## 2024-10-10 DIAGNOSIS — Z00.8 ENCOUNTER FOR OTHER GENERAL EXAMINATION: ICD-10-CM

## 2024-10-10 PROCEDURE — 76641 ULTRASOUND BREAST COMPLETE: CPT

## 2024-10-10 PROCEDURE — 77063 BREAST TOMOSYNTHESIS BI: CPT | Mod: 26

## 2024-10-10 PROCEDURE — 77067 SCR MAMMO BI INCL CAD: CPT | Mod: 26

## 2024-10-10 PROCEDURE — 77067 SCR MAMMO BI INCL CAD: CPT

## 2024-10-10 PROCEDURE — 76641 ULTRASOUND BREAST COMPLETE: CPT | Mod: 26,50

## 2024-10-10 PROCEDURE — 77063 BREAST TOMOSYNTHESIS BI: CPT

## 2025-02-05 ENCOUNTER — OUTPATIENT (OUTPATIENT)
Dept: OUTPATIENT SERVICES | Facility: HOSPITAL | Age: 60
LOS: 1 days | End: 2025-02-05
Payer: COMMERCIAL

## 2025-02-05 ENCOUNTER — APPOINTMENT (OUTPATIENT)
Dept: RADIOLOGY | Facility: CLINIC | Age: 60
End: 2025-02-05
Payer: COMMERCIAL

## 2025-02-05 DIAGNOSIS — Z00.8 ENCOUNTER FOR OTHER GENERAL EXAMINATION: ICD-10-CM

## 2025-02-05 DIAGNOSIS — R92.30 DENSE BREASTS, UNSPECIFIED: ICD-10-CM

## 2025-02-05 PROCEDURE — 77080 DXA BONE DENSITY AXIAL: CPT | Mod: 26

## 2025-02-05 PROCEDURE — 77080 DXA BONE DENSITY AXIAL: CPT
